# Patient Record
Sex: FEMALE | Race: BLACK OR AFRICAN AMERICAN | Employment: FULL TIME | URBAN - METROPOLITAN AREA
[De-identification: names, ages, dates, MRNs, and addresses within clinical notes are randomized per-mention and may not be internally consistent; named-entity substitution may affect disease eponyms.]

---

## 2018-01-13 ENCOUNTER — HOSPITAL ENCOUNTER (OUTPATIENT)
Dept: MAMMOGRAPHY | Age: 63
Discharge: HOME | End: 2018-01-13
Attending: INTERNAL MEDICINE
Payer: COMMERCIAL

## 2018-01-13 DIAGNOSIS — Z12.31 VISIT FOR SCREENING MAMMOGRAM: ICD-10-CM

## 2018-01-13 PROCEDURE — 77067 SCR MAMMO BI INCL CAD: CPT

## 2018-02-07 ENCOUNTER — HOSPITAL ENCOUNTER (OUTPATIENT)
Dept: LAB | Age: 63
Discharge: HOME | End: 2018-02-07

## 2018-02-07 ENCOUNTER — HOSPITAL ENCOUNTER (OUTPATIENT)
Dept: LAB | Age: 63
Discharge: HOME | End: 2018-02-07
Payer: COMMERCIAL

## 2018-02-07 DIAGNOSIS — Z01.818 PRE-OP EVALUATION: ICD-10-CM

## 2018-02-07 LAB
ATRIAL RATE: 95 BPM
CALCULATED P AXIS, ECG09: 68 DEGREES
CALCULATED T AXIS, ECG11: 38 DEGREES
DIAGNOSIS, 93000: NORMAL
P-R INTERVAL, ECG05: 162 MS
Q-T INTERVAL, ECG07: 342 MS
QRS DURATION, ECG06: 84 MS
QTC CALCULATION (BEZET), ECG08: 429 MS
VENTRICULAR RATE, ECG03: 95 BPM

## 2018-02-07 PROCEDURE — 88305 TISSUE EXAM BY PATHOLOGIST: CPT | Performed by: OBSTETRICS & GYNECOLOGY

## 2018-02-07 PROCEDURE — 93005 ELECTROCARDIOGRAM TRACING: CPT

## 2019-02-09 ENCOUNTER — HOSPITAL ENCOUNTER (OUTPATIENT)
Dept: MAMMOGRAPHY | Age: 64
Discharge: HOME | End: 2019-02-09
Attending: OBSTETRICS & GYNECOLOGY
Payer: COMMERCIAL

## 2019-02-09 DIAGNOSIS — Z12.31 VISIT FOR SCREENING MAMMOGRAM: ICD-10-CM

## 2019-02-09 PROCEDURE — 77063 BREAST TOMOSYNTHESIS BI: CPT

## 2019-02-09 PROCEDURE — 77067 SCR MAMMO BI INCL CAD: CPT

## 2020-08-15 ENCOUNTER — HOSPITAL ENCOUNTER (OUTPATIENT)
Dept: MAMMOGRAPHY | Age: 65
Discharge: HOME | End: 2020-08-15
Attending: OBSTETRICS & GYNECOLOGY
Payer: COMMERCIAL

## 2020-08-15 DIAGNOSIS — Z12.31 SCREENING MAMMOGRAM, ENCOUNTER FOR: ICD-10-CM

## 2020-08-15 PROCEDURE — 77063 BREAST TOMOSYNTHESIS BI: CPT

## 2021-09-14 ENCOUNTER — TRANSCRIBE ORDER (OUTPATIENT)
Dept: SCHEDULING | Age: 66
End: 2021-09-14

## 2021-09-14 DIAGNOSIS — Z12.31 VISIT FOR SCREENING MAMMOGRAM: Primary | ICD-10-CM

## 2021-09-25 ENCOUNTER — HOSPITAL ENCOUNTER (OUTPATIENT)
Dept: MAMMOGRAPHY | Age: 66
Discharge: HOME | End: 2021-09-25
Attending: OBSTETRICS & GYNECOLOGY
Payer: COMMERCIAL

## 2021-09-25 DIAGNOSIS — Z12.31 VISIT FOR SCREENING MAMMOGRAM: ICD-10-CM

## 2021-09-25 PROCEDURE — 77063 BREAST TOMOSYNTHESIS BI: CPT

## 2022-09-27 ENCOUNTER — TRANSCRIBE ORDER (OUTPATIENT)
Dept: SCHEDULING | Age: 67
End: 2022-09-27

## 2022-09-27 DIAGNOSIS — Z12.31 VISIT FOR SCREENING MAMMOGRAM: Primary | ICD-10-CM

## 2022-11-19 ENCOUNTER — HOSPITAL ENCOUNTER (OUTPATIENT)
Dept: MAMMOGRAPHY | Age: 67
Discharge: HOME | End: 2022-11-19
Attending: OBSTETRICS & GYNECOLOGY
Payer: COMMERCIAL

## 2022-11-19 DIAGNOSIS — Z12.31 VISIT FOR SCREENING MAMMOGRAM: ICD-10-CM

## 2022-11-19 PROCEDURE — 77067 SCR MAMMO BI INCL CAD: CPT

## 2023-07-28 ENCOUNTER — OFFICE VISIT (OUTPATIENT)
Age: 68
End: 2023-07-28

## 2023-07-28 VITALS
WEIGHT: 152 LBS | HEIGHT: 58 IN | SYSTOLIC BLOOD PRESSURE: 131 MMHG | HEART RATE: 57 BPM | DIASTOLIC BLOOD PRESSURE: 61 MMHG | BODY MASS INDEX: 31.91 KG/M2

## 2023-07-28 DIAGNOSIS — M67.441 DIGITAL MUCOUS CYST OF RIGHT HAND: Primary | ICD-10-CM

## 2023-07-28 DIAGNOSIS — M67.441 DIGITAL MUCOUS CYST OF RIGHT HAND: ICD-10-CM

## 2023-07-28 DIAGNOSIS — Z01.818 PREOP EXAMINATION: Primary | ICD-10-CM

## 2023-07-28 DIAGNOSIS — M15.8 DEGENERATIVE ARTHRITIS OF INTERPHALANGEAL JOINT OF RIGHT THUMB: ICD-10-CM

## 2023-07-28 RX ORDER — ATORVASTATIN CALCIUM 20 MG/1
20 TABLET, FILM COATED ORAL DAILY
COMMUNITY
Start: 2023-07-04

## 2023-07-28 RX ORDER — AMLODIPINE BESYLATE 5 MG/1
TABLET ORAL
COMMUNITY
Start: 2023-06-07

## 2023-07-28 NOTE — PROGRESS NOTES
excision. Discussed operative versus nonoperative treatment, postoperative convalescence, and answered all questions. Return for postop. Plan was reviewed with patient, who verbalized agreement and understanding of the plan    Note: This note was completed using voice recognition software.   Any typographical/name errors or mistakes are unintentional.

## 2023-08-28 DIAGNOSIS — M15.8 DEGENERATIVE ARTHRITIS OF INTERPHALANGEAL JOINT OF RIGHT THUMB: ICD-10-CM

## 2023-08-28 DIAGNOSIS — M67.441 DIGITAL MUCOUS CYST OF RIGHT HAND: Primary | ICD-10-CM

## 2023-08-28 RX ORDER — HYDROCODONE BITARTRATE AND ACETAMINOPHEN 5; 325 MG/1; MG/1
1 TABLET ORAL EVERY 6 HOURS PRN
Qty: 20 TABLET | Refills: 0 | Status: SHIPPED | OUTPATIENT
Start: 2023-08-28 | End: 2023-09-02

## 2023-09-08 ENCOUNTER — OFFICE VISIT (OUTPATIENT)
Age: 68
End: 2023-09-08

## 2023-09-08 VITALS — HEIGHT: 58 IN | WEIGHT: 151 LBS | BODY MASS INDEX: 31.7 KG/M2

## 2023-09-08 DIAGNOSIS — M67.441 DIGITAL MUCOUS CYST OF RIGHT HAND: Primary | ICD-10-CM

## 2023-09-08 DIAGNOSIS — Z98.890 S/P EXCISION OF GANGLION CYST: ICD-10-CM

## 2023-09-08 DIAGNOSIS — M15.8 DEGENERATIVE ARTHRITIS OF INTERPHALANGEAL JOINT OF RIGHT THUMB: ICD-10-CM

## 2023-09-08 PROCEDURE — 99024 POSTOP FOLLOW-UP VISIT: CPT | Performed by: ORTHOPAEDIC SURGERY

## 2023-09-08 NOTE — PROGRESS NOTES
Oracio Nguyen is a 77 y.o. female right handed. Worker's Compensation and legal considerations: none    Chief Complaint   Patient presents with    Finger Pain     Right thumb mass excision     Pain Score:   8    HPI: Patient presents today 2 weeks status post right thumb mucous cyst excision. She reports some tenderness and some opening at the incision site. Initial HPI: Patient presents today with complaints of a bump over the back of her right thumb. She reports a painful especially when bumped. Date of onset: Indeterminate  Injury: No  Prior Treatment:  No    ROS: Review of Systems - General ROS: negative except HPI    Past Medical History:   Diagnosis Date    Arthropathy, unspecified, site unspecified     High blood pressure        Past Surgical History:   Procedure Laterality Date    HYSTERECTOMY (CERVIX STATUS UNKNOWN)      OTHER SURGICAL HISTORY      ovary removal     OVARIAN CYST REMOVAL      OVARY REMOVAL      TONSILLECTOMY          Current Outpatient Medications   Medication Sig Dispense Refill    metFORMIN (GLUCOPHAGE) 500 MG tablet TAKE 1 TABLET BY MOUTH DAILY WITH HEAVY MEAL OF THE DAY FOR 30 DAYS      atorvastatin (LIPITOR) 20 MG tablet Take 1 tablet by mouth daily      amLODIPine (NORVASC) 5 MG tablet TAKE 1 TABLET BY MOUTH EVERY DAY FOR BLOOD PRESSURE ORALLY ONCE A DAY 90 DAYS 30      calcium citrate-vitamin D (CITRACAL+D) 315-5 MG-MCG TABS per tablet Take 1 tablet by mouth daily (with breakfast) (Patient not taking: Reported on 7/28/2023)      estradiol (ESTRACE) 0.5 MG tablet Take by mouth daily      losartan (COZAAR) 100 MG tablet Take 1 tablet by mouth daily      meloxicam (MOBIC) 15 MG tablet Take 15 mg by mouth daily (Patient not taking: Reported on 7/28/2023)      PEG-KCl-NaCl-NaSulf-Na Asc-C (MOVIPREP) 100 g solution Take by mouth       No current facility-administered medications for this visit.        No Known Allergies      Ht 4' 10\" (1.473 m)   Wt 151 lb (68.5 kg)   BMI 31.56

## 2023-09-12 ENCOUNTER — TELEPHONE (OUTPATIENT)
Age: 68
End: 2023-09-12

## 2023-09-12 DIAGNOSIS — M67.441 DIGITAL MUCOUS CYST OF RIGHT HAND: Primary | ICD-10-CM

## 2023-09-12 DIAGNOSIS — Z98.890 S/P EXCISION OF GANGLION CYST: ICD-10-CM

## 2023-09-12 DIAGNOSIS — M15.8 DEGENERATIVE ARTHRITIS OF INTERPHALANGEAL JOINT OF RIGHT THUMB: ICD-10-CM

## 2023-09-12 LAB — SPECIMEN FOR PATHOLOGY - OTHER: NORMAL

## 2023-09-12 NOTE — TELEPHONE ENCOUNTER
Patient stopped by CB. Was seen Friday 9.8.23 for PO. Was told something for inflammation would be called in. She has checked with her pharmacy and they do not have anything for her.   Please call her at [phone]

## 2023-10-25 ENCOUNTER — OFFICE VISIT (OUTPATIENT)
Age: 68
End: 2023-10-25

## 2023-10-25 VITALS — HEIGHT: 58 IN | WEIGHT: 151 LBS | BODY MASS INDEX: 31.7 KG/M2 | TEMPERATURE: 97 F

## 2023-10-25 DIAGNOSIS — Z98.890 S/P EXCISION OF GANGLION CYST: ICD-10-CM

## 2023-10-25 DIAGNOSIS — M15.8 DEGENERATIVE ARTHRITIS OF INTERPHALANGEAL JOINT OF RIGHT THUMB: Primary | ICD-10-CM

## 2023-10-25 PROCEDURE — 99024 POSTOP FOLLOW-UP VISIT: CPT | Performed by: ORTHOPAEDIC SURGERY

## 2023-10-25 RX ORDER — MELOXICAM 15 MG/1
15 TABLET ORAL DAILY
Qty: 30 TABLET | Refills: 0 | Status: SHIPPED | OUTPATIENT
Start: 2023-10-25

## 2023-11-09 ENCOUNTER — TRANSCRIBE ORDERS (OUTPATIENT)
Facility: HOSPITAL | Age: 68
End: 2023-11-09

## 2023-11-09 DIAGNOSIS — Z12.31 SCREENING MAMMOGRAM FOR HIGH-RISK PATIENT: Primary | ICD-10-CM

## 2023-12-09 ENCOUNTER — HOSPITAL ENCOUNTER (OUTPATIENT)
Facility: HOSPITAL | Age: 68
End: 2023-12-09
Payer: COMMERCIAL

## 2023-12-09 VITALS — BODY MASS INDEX: 30.24 KG/M2 | HEIGHT: 59 IN | WEIGHT: 150 LBS

## 2023-12-09 DIAGNOSIS — Z12.31 SCREENING MAMMOGRAM FOR HIGH-RISK PATIENT: ICD-10-CM

## 2023-12-09 PROCEDURE — 77063 BREAST TOMOSYNTHESIS BI: CPT

## 2023-12-10 DIAGNOSIS — M15.8 DEGENERATIVE ARTHRITIS OF INTERPHALANGEAL JOINT OF RIGHT THUMB: ICD-10-CM

## 2023-12-11 RX ORDER — MELOXICAM 15 MG/1
15 TABLET ORAL DAILY
Qty: 30 TABLET | Refills: 0 | OUTPATIENT
Start: 2023-12-11

## 2024-01-17 ENCOUNTER — OFFICE VISIT (OUTPATIENT)
Age: 69
End: 2024-01-17
Payer: COMMERCIAL

## 2024-01-17 VITALS — WEIGHT: 150 LBS | BODY MASS INDEX: 30.24 KG/M2 | HEIGHT: 59 IN

## 2024-01-17 DIAGNOSIS — M67.441 DIGITAL MUCOUS CYST OF RIGHT HAND: Primary | ICD-10-CM

## 2024-01-17 DIAGNOSIS — Z98.890 S/P EXCISION OF GANGLION CYST: ICD-10-CM

## 2024-01-17 PROCEDURE — 1123F ACP DISCUSS/DSCN MKR DOCD: CPT | Performed by: ORTHOPAEDIC SURGERY

## 2024-01-17 PROCEDURE — 99212 OFFICE O/P EST SF 10 MIN: CPT | Performed by: ORTHOPAEDIC SURGERY

## 2024-01-18 NOTE — PROGRESS NOTES
Helena Eden is a 68 y.o. female right handed.  Worker's Compensation and legal considerations: none    Chief Complaint   Patient presents with    Finger Pain     Right thumb pain     Pain Score:   0 - No pain    HPI: Patient presents today approximately 5 months status post right thumb mucous cyst excision.  She reports occasional pain on the ulnar aspect of the IP joint.    10/25/2023 HPI: Patient presents today approximately 2 months status post right thumb mucous cyst excision.  She reports some continued swelling and pain over the back of the thumb.  She also reports decreased range of motion.    Initial HPI: Patient presents today with complaints of a bump over the back of her right thumb.  She reports a painful especially when bumped.    Date of onset: Indeterminate  Injury: No  Prior Treatment:  right thumb mucous cyst excision    ROS: Review of Systems - General ROS: negative except HPI    Past Medical History:   Diagnosis Date    Arthropathy, unspecified, site unspecified     High blood pressure        Past Surgical History:   Procedure Laterality Date    HYSTERECTOMY (CERVIX STATUS UNKNOWN)      OTHER SURGICAL HISTORY      ovary removal     OVARIAN CYST REMOVAL      OVARY REMOVAL      TONSILLECTOMY          Current Outpatient Medications   Medication Sig Dispense Refill    meloxicam (MOBIC) 15 MG tablet Take 1 tablet by mouth daily 30 tablet 0    metFORMIN (GLUCOPHAGE) 500 MG tablet TAKE 1 TABLET BY MOUTH DAILY WITH HEAVY MEAL OF THE DAY FOR 30 DAYS      atorvastatin (LIPITOR) 20 MG tablet Take 1 tablet by mouth daily      amLODIPine (NORVASC) 5 MG tablet TAKE 1 TABLET BY MOUTH EVERY DAY FOR BLOOD PRESSURE ORALLY ONCE A DAY 90 DAYS 30      calcium citrate-vitamin D (CITRACAL+D) 315-5 MG-MCG TABS per tablet Take 1 tablet by mouth daily (with breakfast) (Patient not taking: Reported on 7/28/2023)      estradiol (ESTRACE) 0.5 MG tablet Take by mouth daily      losartan (COZAAR) 100 MG tablet Take 1

## 2025-01-10 ENCOUNTER — TRANSCRIBE ORDERS (OUTPATIENT)
Facility: HOSPITAL | Age: 70
End: 2025-01-10

## 2025-01-10 DIAGNOSIS — Z12.31 OTHER SCREENING MAMMOGRAM: Primary | ICD-10-CM

## 2025-03-15 ENCOUNTER — HOSPITAL ENCOUNTER (OUTPATIENT)
Facility: HOSPITAL | Age: 70
Discharge: HOME | End: 2025-03-18
Payer: MEDICARE

## 2025-03-15 VITALS — HEIGHT: 59 IN | BODY MASS INDEX: 29.23 KG/M2 | WEIGHT: 145 LBS

## 2025-03-15 DIAGNOSIS — Z12.31 OTHER SCREENING MAMMOGRAM: ICD-10-CM

## 2025-03-15 PROCEDURE — 77063 BREAST TOMOSYNTHESIS BI: CPT
